# Patient Record
Sex: FEMALE | Race: WHITE
[De-identification: names, ages, dates, MRNs, and addresses within clinical notes are randomized per-mention and may not be internally consistent; named-entity substitution may affect disease eponyms.]

---

## 2019-04-30 ENCOUNTER — HOSPITAL ENCOUNTER (OUTPATIENT)
Dept: HOSPITAL 19 - MC.RAD | Age: 49
End: 2019-04-30
Attending: INTERNAL MEDICINE
Payer: COMMERCIAL

## 2019-04-30 DIAGNOSIS — Z12.31: Primary | ICD-10-CM

## 2022-01-01 ENCOUNTER — HOSPITAL ENCOUNTER (INPATIENT)
Dept: HOSPITAL 19 - COL.ER | Age: 52
LOS: 4 days | Discharge: HOME | DRG: 177 | End: 2022-01-05
Attending: INTERNAL MEDICINE | Admitting: INTERNAL MEDICINE
Payer: COMMERCIAL

## 2022-01-01 VITALS — HEIGHT: 60 IN | WEIGHT: 204.15 LBS | BODY MASS INDEX: 40.08 KG/M2

## 2022-01-01 VITALS — TEMPERATURE: 99.3 F | DIASTOLIC BLOOD PRESSURE: 84 MMHG | SYSTOLIC BLOOD PRESSURE: 121 MMHG | HEART RATE: 87 BPM

## 2022-01-01 VITALS — TEMPERATURE: 98 F | HEART RATE: 85 BPM | SYSTOLIC BLOOD PRESSURE: 121 MMHG | DIASTOLIC BLOOD PRESSURE: 70 MMHG

## 2022-01-01 VITALS — DIASTOLIC BLOOD PRESSURE: 66 MMHG | HEART RATE: 88 BPM | TEMPERATURE: 98.8 F | SYSTOLIC BLOOD PRESSURE: 112 MMHG

## 2022-01-01 VITALS — TEMPERATURE: 99.2 F | DIASTOLIC BLOOD PRESSURE: 70 MMHG | HEART RATE: 82 BPM | SYSTOLIC BLOOD PRESSURE: 123 MMHG

## 2022-01-01 DIAGNOSIS — L74.3: ICD-10-CM

## 2022-01-01 DIAGNOSIS — U07.1: Primary | ICD-10-CM

## 2022-01-01 DIAGNOSIS — J12.82: ICD-10-CM

## 2022-01-01 DIAGNOSIS — Z88.0: ICD-10-CM

## 2022-01-01 DIAGNOSIS — T36.95XA: ICD-10-CM

## 2022-01-01 DIAGNOSIS — K52.1: ICD-10-CM

## 2022-01-01 DIAGNOSIS — J96.01: ICD-10-CM

## 2022-01-01 LAB
ALBUMIN SERPL-MCNC: 2.8 GM/DL (ref 3.5–5)
ALP SERPL-CCNC: 61 U/L (ref 40–150)
ALT SERPL-CCNC: 89 U/L (ref 0–55)
ANION GAP SERPL CALC-SCNC: 11 MMOL/L (ref 7–16)
AST SERPL-CCNC: 80 U/L (ref 5–34)
BASOPHILS # BLD: 0 K/MM3 (ref 0–0.2)
BASOPHILS NFR BLD AUTO: 0.2 % (ref 0–2)
BILIRUB SERPL-MCNC: 0.4 MG/DL (ref 0.2–1.2)
BUN SERPL-MCNC: 12 MG/DL (ref 10–20)
CALCIUM SERPL-MCNC: 7.7 MG/DL (ref 8.4–10.2)
CHLORIDE SERPL-SCNC: 103 MMOL/L (ref 98–107)
CO2 SERPL-SCNC: 21 MMOL/L (ref 22–29)
CREAT SERPL-SCNC: 0.71 MG/DL (ref 0.57–1.11)
EOSINOPHIL # BLD: 0 K/MM3 (ref 0–0.7)
EOSINOPHIL NFR BLD: 0.2 % (ref 0–4)
ERYTHROCYTE [DISTWIDTH] IN BLOOD BY AUTOMATED COUNT: 13.2 % (ref 11.5–14.5)
GLUCOSE SERPL-MCNC: 106 MG/DL (ref 70–99)
GRANULOCYTES # BLD AUTO: 81.1 % (ref 42.2–75.2)
HCT VFR BLD AUTO: 37.3 % (ref 37–47)
HGB BLD-MCNC: 12.9 G/DL (ref 12.5–16)
KETONES UR STRIP.AUTO-MCNC: (no result) MG/DL
LYMPHOCYTES # BLD: 0.7 K/MM3 (ref 1.2–3.4)
LYMPHOCYTES NFR BLD: 12.4 % (ref 20–51)
MCH RBC QN AUTO: 31 PG (ref 27–31)
MCHC RBC AUTO-ENTMCNC: 35 G/DL (ref 33–37)
MCV RBC AUTO: 89 FL (ref 80–100)
MONOCYTES # BLD: 0.3 K/MM3 (ref 0.1–0.6)
MONOCYTES NFR BLD AUTO: 5.8 % (ref 1.7–9.3)
NEUTROPHILS # BLD: 4.7 K/MM3 (ref 1.4–6.5)
PH UR STRIP.AUTO: 5 [PH] (ref 5–8)
PLATELET # BLD AUTO: 166 K/MM3 (ref 130–400)
PMV BLD AUTO: 10.1 FL (ref 7.4–10.4)
POTASSIUM SERPL-SCNC: 4.4 MMOL/L (ref 3.5–4.5)
PROT SERPL-MCNC: 6.6 GM/DL (ref 6.2–8.1)
RBC # BLD AUTO: 4.2 M/MM3 (ref 4.1–5.3)
RBC # UR: (no result) /HPF (ref 0–2)
SODIUM SERPL-SCNC: 135 MMOL/L (ref 136–145)
SP GR UR STRIP.AUTO: 1.02 (ref 1–1.03)
SQUAMOUS # URNS: (no result) /HPF (ref 0–10)
UA DIPSTICK PNL UR STRIP.AUTO: (no result)
URN COLLECT METHOD CLASS: (no result)

## 2022-01-01 PROCEDURE — XW033E5 INTRODUCTION OF REMDESIVIR ANTI-INFECTIVE INTO PERIPHERAL VEIN, PERCUTANEOUS APPROACH, NEW TECHNOLOGY GROUP 5: ICD-10-PCS | Performed by: INTERNAL MEDICINE

## 2022-01-01 NOTE — NUR
ASSESSMENT COMPLETE. PT COOPERATIVE WITH CARES. PT RESTING IN HER RECLINER
WATCHING TV AND TALKING ON THE PHONE. PT DENIES PAIN, PALPITATION, SOB OR
DIZZINESS. PT DOES HAVE A PERSISTENT NONPRODUCTIVE COUGH. PT STATES SHE HAS
NO OTHER NEEDS AT THIS TIME. CALL LIGHT WITHIN REACH.

## 2022-01-01 NOTE — NUR
Patient to room 303 from the ED by wheelchair. A&Ox4. Independent in the room.
VSS 4L NC O2, no reported SOB. Denies pain and discomfort. Droplet/contact
precautions in place. Nurse oriented patient to covid unit/visitor policy. No
further needs expressed. Call light within reach

## 2022-01-01 NOTE — NUR
Patient taken down for a CT. Has been sitting up in the recliner. A&Ox4. VSS
2L NC O2 denies SOB, intermittent cough. Independent in the room. Call light
within reach.

## 2022-01-02 VITALS — HEART RATE: 74 BPM | DIASTOLIC BLOOD PRESSURE: 65 MMHG | SYSTOLIC BLOOD PRESSURE: 116 MMHG | TEMPERATURE: 98.2 F

## 2022-01-02 VITALS — DIASTOLIC BLOOD PRESSURE: 73 MMHG | TEMPERATURE: 97.7 F | SYSTOLIC BLOOD PRESSURE: 137 MMHG | HEART RATE: 85 BPM

## 2022-01-02 VITALS — HEART RATE: 81 BPM | TEMPERATURE: 97.9 F | SYSTOLIC BLOOD PRESSURE: 129 MMHG | DIASTOLIC BLOOD PRESSURE: 74 MMHG

## 2022-01-02 VITALS — HEART RATE: 78 BPM | SYSTOLIC BLOOD PRESSURE: 139 MMHG | TEMPERATURE: 98.6 F | DIASTOLIC BLOOD PRESSURE: 82 MMHG

## 2022-01-02 LAB
ALBUMIN SERPL-MCNC: 2.7 GM/DL (ref 3.5–5)
ALP SERPL-CCNC: 68 U/L (ref 40–150)
ALT SERPL-CCNC: 116 U/L (ref 0–55)
ANION GAP SERPL CALC-SCNC: 11 MMOL/L (ref 7–16)
AST SERPL-CCNC: 88 U/L (ref 5–34)
BILIRUB SERPL-MCNC: 0.4 MG/DL (ref 0.2–1.2)
BUN SERPL-MCNC: 12 MG/DL (ref 10–20)
CALCIUM SERPL-MCNC: 8 MG/DL (ref 8.4–10.2)
CHLORIDE SERPL-SCNC: 105 MMOL/L (ref 98–107)
CO2 SERPL-SCNC: 21 MMOL/L (ref 22–29)
CREAT SERPL-SCNC: 0.64 MG/DL (ref 0.57–1.11)
CRP SERPL-MCNC: 13.01 MG/DL (ref 0–0.5)
ERYTHROCYTE [DISTWIDTH] IN BLOOD BY AUTOMATED COUNT: 13.1 % (ref 11.5–14.5)
GLUCOSE SERPL-MCNC: 132 MG/DL (ref 70–99)
HCT VFR BLD AUTO: 35.8 % (ref 37–47)
HGB BLD-MCNC: 12.2 G/DL (ref 12.5–16)
LYMPHOCYTES NFR BLD MANUAL: 12 % (ref 20–51)
MCH RBC QN AUTO: 30 PG (ref 27–31)
MCHC RBC AUTO-ENTMCNC: 34 G/DL (ref 33–37)
MCV RBC AUTO: 89 FL (ref 80–100)
MONOCYTES NFR BLD: 8 % (ref 1.7–9.3)
NEUTS BAND NFR BLD: 8 % (ref 0–10)
NEUTS SEG NFR BLD MANUAL: 72 % (ref 42–75.2)
PLATELET # BLD AUTO: 196 K/MM3 (ref 130–400)
PLATELET BLD QL SMEAR: NORMAL
PMV BLD AUTO: 11.2 FL (ref 7.4–10.4)
POTASSIUM SERPL-SCNC: 4.1 MMOL/L (ref 3.5–4.5)
PROT SERPL-MCNC: 6.7 GM/DL (ref 6.2–8.1)
RBC # BLD AUTO: 4.04 M/MM3 (ref 4.1–5.3)
SODIUM SERPL-SCNC: 137 MMOL/L (ref 136–145)

## 2022-01-02 NOTE — NUR
Patient sitting up in the recliner, A&Ox4. VSS 3L, no reported SOB. Denies
pain and discomfort. Indpendent in the room. Droplet/contact precautions in
place. No further needs expressed. Call light within reach

## 2022-01-02 NOTE — NUR
Patient had an uneventful day. A&Ox4. VSS 2L NC O2, no reported SOB. Denies
pain and discomfort. Independent in the room. Droplet/contact precautions in
place. No further needs expressed. Call light within reach

## 2022-01-03 VITALS — TEMPERATURE: 98.3 F | DIASTOLIC BLOOD PRESSURE: 77 MMHG | SYSTOLIC BLOOD PRESSURE: 123 MMHG | HEART RATE: 73 BPM

## 2022-01-03 VITALS — TEMPERATURE: 97.9 F | DIASTOLIC BLOOD PRESSURE: 63 MMHG | HEART RATE: 70 BPM | SYSTOLIC BLOOD PRESSURE: 123 MMHG

## 2022-01-03 VITALS — HEART RATE: 75 BPM | DIASTOLIC BLOOD PRESSURE: 60 MMHG | SYSTOLIC BLOOD PRESSURE: 120 MMHG | TEMPERATURE: 98.2 F

## 2022-01-03 VITALS — TEMPERATURE: 98 F | SYSTOLIC BLOOD PRESSURE: 119 MMHG | HEART RATE: 72 BPM | DIASTOLIC BLOOD PRESSURE: 67 MMHG

## 2022-01-03 NOTE — NUR
Assessment completed, alert/ oriented, vital signs stable/ afebrile at this
time, denies pain, denies resp.difficulty while at rest, good air exchange
bilaterally but does have some crackles lungs bases, remains on 2L. oxygen,
heart RRR, distal pusles are palpable, up in recliner and independent in her
room, denies needs at this time

## 2022-01-03 NOTE — NUR
ASSESSMENT COMPLETE FOR THIS SHIFT. PT COOPERATIVE WITH CARES. PT RESTING IN
BED WATCHING TV WHEN I CAME IN TO DO HER ASSESSMENT. RT TITRATED PT DOWN FROM
2L OF O2 TO RA. PT HAD A HARD TIME MAINTAINING HER STATS ABOVE 90% ON RA. PT
PLACED BACK ON 2L. PT ENCOURAGE AND WAS USING HER SPIROMETER, WHILE I WAS IN
HER ROOM DOING HER ASSESSMENT. PT DENIED PAIN, PALPITATIONS, SOB OR DIZZINESS.
PT SEEMED TO COUGH LESS THIS SHIFT, THEN LAST NIGHT. PT STATES SHE HAS NO
OTHER NEEDS AT THIS TIME. CALL LIGHT WITHIN REACH.

## 2022-01-03 NOTE — NUR
PT HAS FLAT AFFECT, AOX4, REPORTS DIARRHEA BUT NO N/V, ASSESSMENT PERFORMED,
MEDICATIONS GIVEN, CALL LGT WITHIN REACH, DENIES PAIN

## 2022-01-03 NOTE — NUR
The patient is COVID positive. SW contacted the patient's room phone to
discuss discharge plan. The patient lives in [place] with her , Charles
(ph#[phone]). She reports independence with ADLs and does not have any
DME. The patient's PCP is Dr. Mike Tim and she receives her
medications from [facility]. The patient does not have a DPOA-HC in EMR, but she
states that she does have one completed and that it designates her  and
the alternate is her son, Manish. The patient's next of kin is her .
The patient plans on returning home with her  upon discharge. She is
currently requiring 2 liters of oxygen. SW to continue to monitor.
 
*Discharge plan: home with *

## 2022-01-03 NOTE — NUR
PT AOX4, NEW IV STARTED TO LH 22G, RH INT REMOVED, REMDESEVIR INFUSED, NO
OTHER NEEDS, DINNER DELIVERED, UNEVENTFUL SHIFT

## 2022-01-04 VITALS — DIASTOLIC BLOOD PRESSURE: 82 MMHG | HEART RATE: 70 BPM | TEMPERATURE: 97.9 F | SYSTOLIC BLOOD PRESSURE: 105 MMHG

## 2022-01-04 VITALS — HEART RATE: 70 BPM | SYSTOLIC BLOOD PRESSURE: 140 MMHG | DIASTOLIC BLOOD PRESSURE: 92 MMHG | TEMPERATURE: 98.5 F

## 2022-01-04 VITALS — DIASTOLIC BLOOD PRESSURE: 80 MMHG | HEART RATE: 75 BPM | TEMPERATURE: 98.2 F | SYSTOLIC BLOOD PRESSURE: 141 MMHG

## 2022-01-04 VITALS — TEMPERATURE: 98.5 F | HEART RATE: 71 BPM | SYSTOLIC BLOOD PRESSURE: 134 MMHG | DIASTOLIC BLOOD PRESSURE: 77 MMHG

## 2022-01-04 VITALS — DIASTOLIC BLOOD PRESSURE: 85 MMHG | SYSTOLIC BLOOD PRESSURE: 154 MMHG | TEMPERATURE: 98.5 F | HEART RATE: 81 BPM

## 2022-01-04 VITALS — HEART RATE: 74 BPM | TEMPERATURE: 98.1 F | DIASTOLIC BLOOD PRESSURE: 78 MMHG | SYSTOLIC BLOOD PRESSURE: 112 MMHG

## 2022-01-04 LAB
ANION GAP SERPL CALC-SCNC: 11 MMOL/L (ref 7–16)
BUN SERPL-MCNC: 23 MG/DL (ref 10–20)
CALCIUM SERPL-MCNC: 8.1 MG/DL (ref 8.4–10.2)
CHLORIDE SERPL-SCNC: 108 MMOL/L (ref 98–107)
CO2 SERPL-SCNC: 22 MMOL/L (ref 22–29)
CREAT SERPL-SCNC: 0.67 MG/DL (ref 0.57–1.11)
CRP SERPL-MCNC: 2.58 MG/DL (ref 0–0.5)
ERYTHROCYTE [DISTWIDTH] IN BLOOD BY AUTOMATED COUNT: 13.6 % (ref 11.5–14.5)
GLUCOSE SERPL-MCNC: 106 MG/DL (ref 70–99)
HCT VFR BLD AUTO: 36.1 % (ref 37–47)
HGB BLD-MCNC: 12.1 G/DL (ref 12.5–16)
LYMPHOCYTES NFR BLD MANUAL: 14 % (ref 20–51)
MAGNESIUM SERPL-MCNC: 2.2 MG/DL (ref 1.6–2.6)
MCH RBC QN AUTO: 30 PG (ref 27–31)
MCHC RBC AUTO-ENTMCNC: 34 G/DL (ref 33–37)
MCV RBC AUTO: 91 FL (ref 80–100)
MONOCYTES NFR BLD: 6 % (ref 1.7–9.3)
NEUTS BAND NFR BLD: 6 % (ref 0–10)
NEUTS SEG NFR BLD MANUAL: 74 % (ref 42–75.2)
PLATELET # BLD AUTO: 308 K/MM3 (ref 130–400)
PLATELET BLD QL SMEAR: NORMAL
PMV BLD AUTO: 10.9 FL (ref 7.4–10.4)
POTASSIUM SERPL-SCNC: 4.1 MMOL/L (ref 3.5–4.5)
RBC # BLD AUTO: 3.99 M/MM3 (ref 4.1–5.3)
SODIUM SERPL-SCNC: 141 MMOL/L (ref 136–145)

## 2022-01-04 NOTE — NUR
Initial shift assessment done- denies pain, states some SOB with exertion,
VSS, o2 sats 93% on 2L/nc, informed pt of order for covid swab- pt not happy
about that- doesnt understand the reasoning-- she did let me get the swab,
states she glad shes going home tomorrow-

## 2022-01-04 NOTE — NUR
PT RESTING IN BED. MORNING MEDICATIONS GIVEN. SHIFT ASSESSMENT COMPLETED.
DENIES ANY PAIN. IS CURRENTLY ON 1L VIA NC. EAGER TO GO HOME. WILL CONTINUE TO
MONITOR.

## 2022-01-05 VITALS — DIASTOLIC BLOOD PRESSURE: 82 MMHG | SYSTOLIC BLOOD PRESSURE: 143 MMHG | HEART RATE: 75 BPM | TEMPERATURE: 98 F

## 2022-01-05 VITALS — HEART RATE: 73 BPM | DIASTOLIC BLOOD PRESSURE: 70 MMHG | SYSTOLIC BLOOD PRESSURE: 119 MMHG | TEMPERATURE: 97.5 F

## 2022-01-05 VITALS — TEMPERATURE: 97.7 F | HEART RATE: 70 BPM | DIASTOLIC BLOOD PRESSURE: 73 MMHG | SYSTOLIC BLOOD PRESSURE: 132 MMHG

## 2022-01-05 VITALS — DIASTOLIC BLOOD PRESSURE: 85 MMHG | SYSTOLIC BLOOD PRESSURE: 144 MMHG | TEMPERATURE: 98.3 F | HEART RATE: 82 BPM

## 2022-01-05 LAB
ALBUMIN SERPL-MCNC: 2.6 GM/DL (ref 3.5–5)
ALP SERPL-CCNC: 56 U/L (ref 40–150)
ALT SERPL-CCNC: 124 U/L (ref 0–55)
AST SERPL-CCNC: 37 U/L (ref 5–34)
BILIRUB DIRECT SERPL-MCNC: 0.3 MG/DL (ref 0–0.5)
BILIRUB SERPL-MCNC: 0.5 MG/DL (ref 0.2–1.2)
PROT SERPL-MCNC: 6 GM/DL (ref 6.2–8.1)

## 2022-01-05 NOTE — NUR
PT RESTING IN BED. MORNING MEDICATIONS GIVEN. SHIFT ASSESSMENT COMPLETED.
DENIES ANY PAIN OR NEEDS. PRN O2 AT 2L VIA NC. EAGER TO GO HOME, EXPLAINED
PLAN FOR THE DAY. WILL CONTINUE TO MONITOR.

## 2022-01-05 NOTE — NUR
An exercise oximetry was ordered. RT notified SW that the patient did not
qualify for oxygen. The patient is to tentatively discharge later today. No
additional needs at this time.

## 2022-01-05 NOTE — NUR
Quiet night- o2 sats 91-97% on the 2 L/nc, covid swab sent earlier did come
back positive-  Maria Fernanda MACKAY and house supervisor aware,

## 2022-01-06 ENCOUNTER — HOSPITAL ENCOUNTER (EMERGENCY)
Dept: HOSPITAL 19 - COL.ER | Age: 52
Discharge: ANOTHER HOSPITAL | End: 2022-01-06
Attending: PERSONAL EMERGENCY RESPONSE ATTENDANT
Payer: COMMERCIAL

## 2022-01-06 VITALS — WEIGHT: 200.4 LBS | BODY MASS INDEX: 39.34 KG/M2 | HEIGHT: 60 IN

## 2022-01-06 VITALS — SYSTOLIC BLOOD PRESSURE: 210 MMHG | HEART RATE: 80 BPM | DIASTOLIC BLOOD PRESSURE: 109 MMHG

## 2022-01-06 VITALS — TEMPERATURE: 97.7 F

## 2022-01-06 DIAGNOSIS — Z86.16: ICD-10-CM

## 2022-01-06 DIAGNOSIS — I63.89: Primary | ICD-10-CM

## 2022-01-06 LAB
ALBUMIN SERPL-MCNC: 2.8 GM/DL (ref 3.5–5)
ALP SERPL-CCNC: 61 U/L (ref 40–150)
ALT SERPL-CCNC: 109 U/L (ref 0–55)
ANION GAP SERPL CALC-SCNC: 12 MMOL/L (ref 7–16)
APTT PPP: 22.1 SECONDS (ref 26–37)
AST SERPL-CCNC: 28 U/L (ref 5–34)
BILIRUB SERPL-MCNC: 0.8 MG/DL (ref 0.2–1.2)
BUN SERPL-MCNC: 19 MG/DL (ref 10–20)
CALCIUM SERPL-MCNC: 8.3 MG/DL (ref 8.4–10.2)
CHLORIDE SERPL-SCNC: 101 MMOL/L (ref 98–107)
CO2 SERPL-SCNC: 22 MMOL/L (ref 22–29)
CREAT SERPL-SCNC: 0.67 MG/DL (ref 0.57–1.11)
ERYTHROCYTE [DISTWIDTH] IN BLOOD BY AUTOMATED COUNT: 13 % (ref 11.5–14.5)
GLUCOSE SERPL-MCNC: 169 MG/DL (ref 70–99)
HCT VFR BLD AUTO: 39 % (ref 37–47)
HGB BLD-MCNC: 13.5 G/DL (ref 12.5–16)
INR BLD: 1.4 (ref 0.8–3)
LYMPHOCYTES NFR BLD MANUAL: 8 % (ref 20–51)
MCH RBC QN AUTO: 30 PG (ref 27–31)
MCHC RBC AUTO-ENTMCNC: 35 G/DL (ref 33–37)
MCV RBC AUTO: 87 FL (ref 80–100)
MONOCYTES NFR BLD: 5 % (ref 1.7–9.3)
NEUTS BAND NFR BLD: 2 % (ref 0–10)
NEUTS SEG NFR BLD MANUAL: 85 % (ref 42–75.2)
PLATELET # BLD AUTO: 403 K/MM3 (ref 130–400)
PLATELET BLD QL SMEAR: NORMAL
PMV BLD AUTO: 9.6 FL (ref 7.4–10.4)
POTASSIUM SERPL-SCNC: 3.7 MMOL/L (ref 3.5–4.5)
PROT SERPL-MCNC: 6.6 GM/DL (ref 6.2–8.1)
PROTHROMBIN TIME: 15.4 SECONDS (ref 9.7–12.8)
RBC # BLD AUTO: 4.46 M/MM3 (ref 4.1–5.3)
SODIUM SERPL-SCNC: 135 MMOL/L (ref 136–145)

## 2022-01-06 PROCEDURE — A4314 CATH W/DRAINAGE 2-WAY LATEX: HCPCS

## 2022-01-06 PROCEDURE — A9585 GADOBUTROL INJECTION: HCPCS

## 2022-02-28 ENCOUNTER — HOSPITAL ENCOUNTER (OUTPATIENT)
Dept: HOSPITAL 19 - MKS.ESL.PT | Age: 52
Discharge: HOME | End: 2022-02-28
Payer: COMMERCIAL

## 2022-02-28 DIAGNOSIS — I69.354: Primary | ICD-10-CM

## 2022-03-15 ENCOUNTER — HOSPITAL ENCOUNTER (OUTPATIENT)
Dept: HOSPITAL 19 - MKS.ESL.PT | Age: 52
LOS: 6 days | Discharge: HOME | End: 2022-03-21
Payer: COMMERCIAL

## 2022-03-15 DIAGNOSIS — I69.354: Primary | ICD-10-CM
